# Patient Record
Sex: FEMALE | Race: WHITE | NOT HISPANIC OR LATINO | Employment: OTHER | URBAN - METROPOLITAN AREA
[De-identification: names, ages, dates, MRNs, and addresses within clinical notes are randomized per-mention and may not be internally consistent; named-entity substitution may affect disease eponyms.]

---

## 2024-03-04 NOTE — PROGRESS NOTES
A/P  Second opinion      PMB    Seen and had US 3/1/24-     Endometrium: Thickness: Thickened. Measures up to 0.8 cm. There are subendometrial cysts.         Options based on the findings of the office EMBX:    Diagnoses is Hyperplasia without atypia there is a polyp  There is a small chance of progressing to cancer (1%)  With the findings of polyp tissue I recommend a Dilation and Currattage and hysteroscopy to find the polyp sample and again sample the cavity - confirming no atypia (EIN)  If that is normal options are   Progesterone to de nude the cavity  Waiting to see if the DC sample de nude the cavity     At that point will need re sample in 3 months to make sure no progression or reverted to normal     If there is progression or not converted will need more definitive treatment with a hysterectomy.              She will think about it and let me know

## 2024-03-06 ENCOUNTER — OFFICE VISIT (OUTPATIENT)
Dept: OBGYN CLINIC | Facility: MEDICAL CENTER | Age: 69
End: 2024-03-06
Payer: COMMERCIAL

## 2024-03-06 VITALS — BODY MASS INDEX: 39.27 KG/M2 | WEIGHT: 235.7 LBS | HEIGHT: 65 IN

## 2024-03-06 DIAGNOSIS — N85.00 ENDOMETRIAL HYPERPLASIA WITHOUT ATYPIA: Primary | ICD-10-CM

## 2024-03-06 PROCEDURE — 99203 OFFICE O/P NEW LOW 30 MIN: CPT | Performed by: OBSTETRICS & GYNECOLOGY

## 2024-03-06 NOTE — PATIENT INSTRUCTIONS
Options based on the findings of the office EMBX:    Diagnoses is Hyperplasia without atypia there is a polyp  There is a small chance of progressing to cancer (1%)  With the findings of polyp tissue I recommend a Dilation and Currattage and hysteroscopy to find the polyp sample and again sample the cavity - confirming no atypia (EIN)  If that is normal options are   Progesterone to de nude the cavity  Waiting to see if the DC sample de nude the cavity     At that point will need re sample in 3 months to make sure no progression or reverted to normal     If there is progression or not converted will need more definitive treatment with a hysterectomy.          Patient Education: Endometrial Hyperplasia    What is endometrial hyperplasia?  Endometrial hyperplasia occurs when the endometrium, the lining of the uterus, becomes too thick. It is not cancer, but in some cases, it can lead to cancer of the uterus.  Endometrial hyperplasia most often is caused by excess estrogen without progesterone. If ovulation does not occur, progesterone is not made, and the lining is not shed. The endometrium may continue to grow in response to estrogen. The cells that make up the lining may crowd together and may become abnormal. This condition, called hyperplasia, may lead to cancer in some women.    Endometrial hyperplasia usually occurs after menopause, when ovulation stops and progesterone is no longer made. It also can occur during perimenopause, when ovulation may not occur regularly. Listed as follows are other situations in which women may have high levels of estrogen and not enough progesterone:  - Use of medications that act like estrogen  - Long-term use of high doses of estrogen after menopause (in women who have not had a hysterectomy)  - Irregular menstrual periods, especially associated with polycystic ovary syndrome or infertility  - Obesity    What are the types of endometrial hyperplasia?  Endometrial hyperplasia is  classified as simple or complex. It also is classified by whether certain cell changes are present or absent. If abnormal changes are present, it is called atypical. The terms are combined to describe the exact kind of hyperplasia:  Simple hyperplasia without atypia  Complex hyperplasia without atypia  Simple atypical hyperplasia  Complex atypical hyperplasia    What is the risk of developing cancer?  For women with endometrial hyperplasia without atypia, the risk of cancer is 1-5%.  For women with endometrial hyperplasia with atypia, the risk of cancer is approximately 10-37%.    What treatments are available for endometrial hyperplasia?  The three most common options for the management of endometrial hyperplasia are surveillance, progestin therapy, and hysterectomy.    All management strategies should also be accompanied by removal of the extrinsic or intrinsic source of unopposed estrogen since excess exposure to estrogen is a main etiology of endometrial neoplasia.  This may include identifying and stopping use of nonprescription medications or topical products that contain estrogen, stopping unopposed estrogen therapy or adding a progestin, correcting ovulatory dysfunction, or losing weight.    In many cases, endometrial hyperplasia can be treated with progestin. Progestins oppose the effect of estrogen on the endometrium. Progestin is given orally, in a shot, or in an intrauterine device.   - Mirena/Liletta IUD (daily dose of levonorgestrel- 20 mcg; dose decreases to 10 mcg/day at 5 years) - Mirena is now considered first line therapy; it has been found in randomized trials to be more effective than oral progestins.  - Medroxyprogesterone acetate (10-20 mg)  - Megestrol acetate ( mg)  - Norethindrone acetate (5-15 mg)  - Depo meroxyprogesterone acetate (150 mg every 2 months)    If you have atypical hyperplasia, especially complex atypical hyperplasia, the risk of cancer is increased. Hysterectomy  usually is the best treatment option if you do not want to have any more children.      What is the recommended follow up for endometrial hyperplasia without atypia?  After starting treatment with progestin therapy, a repeat endometrial biopsy will be recommended in 3 to 6 months.  If repeat endometrial biopsy shows persistent endometrial hyperplasia, your doctor may recommend a D&C to confirm that more severe (atypical) hyperplasia is not present.  If repeat biopsy shows atypical endometrial hyperplasia, the recommended treatment is hysterectomy.    References:  1. [employer] Patient FAQs. Endometrial Hyperplasia. https://www.acog.org/womens-health/faqs/endometrial-hyperplasia  2. UpToDate.  Management of endometrial hyperplasia. September 2020.